# Patient Record
Sex: FEMALE
[De-identification: names, ages, dates, MRNs, and addresses within clinical notes are randomized per-mention and may not be internally consistent; named-entity substitution may affect disease eponyms.]

---

## 2018-08-14 ENCOUNTER — RESULT REVIEW (OUTPATIENT)
Age: 68
End: 2018-08-14

## 2019-05-20 ENCOUNTER — APPOINTMENT (OUTPATIENT)
Dept: DERMATOLOGY | Facility: CLINIC | Age: 69
End: 2019-05-20
Payer: MEDICARE

## 2019-05-20 VITALS — BODY MASS INDEX: 28 KG/M2 | WEIGHT: 158 LBS | HEIGHT: 63 IN

## 2019-05-20 DIAGNOSIS — Z78.9 OTHER SPECIFIED HEALTH STATUS: ICD-10-CM

## 2019-05-20 DIAGNOSIS — Z85.820 PERSONAL HISTORY OF MALIGNANT MELANOMA OF SKIN: ICD-10-CM

## 2019-05-20 PROCEDURE — 99202 OFFICE O/P NEW SF 15 MIN: CPT

## 2019-05-20 RX ORDER — ASPIRIN 81 MG
81 TABLET, DELAYED RELEASE (ENTERIC COATED) ORAL DAILY
Refills: 0 | Status: ACTIVE | COMMUNITY

## 2019-05-20 RX ORDER — WARFARIN 5 MG/1
5 TABLET ORAL DAILY
Refills: 0 | Status: ACTIVE | COMMUNITY

## 2019-05-20 RX ORDER — FLUTICASONE PROPIONATE 0.5 MG/G
0.05 CREAM TOPICAL
Qty: 1 | Refills: 0 | Status: ACTIVE | COMMUNITY
Start: 2019-05-20 | End: 1900-01-01

## 2019-05-20 RX ORDER — AMLODIPINE BESYLATE 5 MG/1
5 TABLET ORAL DAILY
Refills: 0 | Status: ACTIVE | COMMUNITY

## 2019-05-20 RX ORDER — HYDROCORTISONE 25 MG/G
2.5 OINTMENT TOPICAL
Qty: 1 | Refills: 0 | Status: ACTIVE | COMMUNITY
Start: 2019-05-20 | End: 1900-01-01

## 2019-05-20 RX ORDER — METOPROLOL TARTRATE 50 MG/1
50 TABLET, FILM COATED ORAL DAILY
Refills: 0 | Status: ACTIVE | COMMUNITY

## 2019-05-20 NOTE — PHYSICAL EXAM
[Alert] : alert [Oriented x 3] : ~L oriented x 3 [Well Nourished] : well nourished [FreeTextEntry3] : Face: Mild erythema and scaling present on the upper eyelids.\par Right nasolabial fold: Narrow bright erythematous, slightly scaly patch.\par Similar bright erythematous, scaly patches present on the right chin, anterior neck fold, and left lower neck

## 2019-05-20 NOTE — HISTORY OF PRESENT ILLNESS
[FreeTextEntry1] : Itchy rash [de-identified] : First visit for a 68-year-old white female with a one-month history of a mildly itchy and scaly rash on the upper eyelids. Saw ophthalmologist, who prescribed prescription eyedrops (Lotamax and something else) of some kind for 5 days without improvement.\par Over the past 10 days, the rash has spread to the right nasolabial fold and anterior neck. The neck in particular, is itchy. Self treated with Neosporin without improvement.\par No previous episodes.\par Note-patient is a "germaphobe" and washes hands 50 times per day\par \par Note-patient has a history of a malignant melanoma on the right lateral leg. She is followed by Krystal Menjivar for this.

## 2019-08-27 ENCOUNTER — APPOINTMENT (OUTPATIENT)
Dept: DERMATOLOGY | Facility: CLINIC | Age: 69
End: 2019-08-27
Payer: MEDICARE

## 2019-08-27 DIAGNOSIS — L30.8 OTHER SPECIFIED DERMATITIS: ICD-10-CM

## 2019-08-27 PROCEDURE — 99213 OFFICE O/P EST LOW 20 MIN: CPT

## 2019-08-27 RX ORDER — BETAMETHASONE DIPROPIONATE 0.5 MG/G
0.05 CREAM TOPICAL
Qty: 1 | Refills: 0 | Status: ACTIVE | COMMUNITY
Start: 2019-08-27 | End: 1900-01-01

## 2019-08-27 NOTE — HISTORY OF PRESENT ILLNESS
[de-identified] : Followup visit for 58-year-old white female first seen by me on May 20, 2018, with a one-month history of an itchy scaly rash on the upper eyelids which later spread to the face and neck. Treated with 2 1/2% hydrocortisone ointment to the eyelids and fluticasone cream 0.05% to the face and neck with improvement but with a rash consistently recurring. Recently had a marked flare requiring her to go to a walk-in clinic on August 25.  No treatment given. Rash has significantly improved with the current treatment.

## 2019-08-27 NOTE — PHYSICAL EXAM
[Alert] : alert [Oriented x 3] : ~L oriented x 3 [Well Nourished] : well nourished [FreeTextEntry3] : Face and neck: Mild to moderate faint pink slightly scaly patches present, especially the right infraorbital area, right and left cheek, and left neck\par Photos on patient's phone show moderate marked erythematous scaly patches on the face and neck, some in a linear configuration on the neck

## 2019-09-09 ENCOUNTER — APPOINTMENT (OUTPATIENT)
Dept: DERMATOLOGY | Facility: CLINIC | Age: 69
End: 2019-09-09

## 2019-09-11 ENCOUNTER — APPOINTMENT (OUTPATIENT)
Dept: DERMATOLOGY | Facility: CLINIC | Age: 69
End: 2019-09-11

## 2019-09-12 ENCOUNTER — APPOINTMENT (OUTPATIENT)
Dept: DERMATOLOGY | Facility: CLINIC | Age: 69
End: 2019-09-12

## 2023-06-29 ENCOUNTER — RX ONLY (RX ONLY)
Age: 73
End: 2023-06-29

## 2023-06-29 ENCOUNTER — OFFICE (OUTPATIENT)
Facility: LOCATION | Age: 73
Setting detail: OPHTHALMOLOGY
End: 2023-06-29
Payer: MEDICARE

## 2023-06-29 DIAGNOSIS — H16.223: ICD-10-CM

## 2023-06-29 DIAGNOSIS — H25.13: ICD-10-CM

## 2023-06-29 DIAGNOSIS — H35.40: ICD-10-CM

## 2023-06-29 DIAGNOSIS — H01.004: ICD-10-CM

## 2023-06-29 DIAGNOSIS — H43.393: ICD-10-CM

## 2023-06-29 DIAGNOSIS — H11.153: ICD-10-CM

## 2023-06-29 DIAGNOSIS — H01.001: ICD-10-CM

## 2023-06-29 PROCEDURE — 99213 OFFICE O/P EST LOW 20 MIN: CPT | Performed by: OPHTHALMOLOGY

## 2023-06-29 ASSESSMENT — CONFRONTATIONAL VISUAL FIELD TEST (CVF)
OS_FINDINGS: FULL
OD_FINDINGS: FULL

## 2023-07-03 ASSESSMENT — VISUAL ACUITY
OD_BCVA: 20/50
OS_BCVA: 20/30-

## 2023-07-03 ASSESSMENT — LID EXAM ASSESSMENTS
OS_BLEPHARITIS: 1+
OD_BLEPHARITIS: 1+

## 2023-07-03 ASSESSMENT — SUPERFICIAL PUNCTATE KERATITIS (SPK)
OD_SPK: T
OS_SPK: T